# Patient Record
Sex: FEMALE | Race: ASIAN
[De-identification: names, ages, dates, MRNs, and addresses within clinical notes are randomized per-mention and may not be internally consistent; named-entity substitution may affect disease eponyms.]

---

## 2019-11-06 ENCOUNTER — HOSPITAL ENCOUNTER (OUTPATIENT)
Dept: HOSPITAL 75 - PREOP | Age: 84
Discharge: HOME | End: 2019-11-06
Attending: SURGERY
Payer: MEDICARE

## 2019-11-06 DIAGNOSIS — Z01.818: Primary | ICD-10-CM

## 2022-06-02 ENCOUNTER — HOSPITAL ENCOUNTER (EMERGENCY)
Dept: HOSPITAL 75 - ER | Age: 87
Discharge: HOME | End: 2022-06-02
Payer: MEDICARE

## 2022-06-02 VITALS — BODY MASS INDEX: 25.49 KG/M2 | WEIGHT: 129.85 LBS | HEIGHT: 59.84 IN

## 2022-06-02 VITALS — SYSTOLIC BLOOD PRESSURE: 195 MMHG | DIASTOLIC BLOOD PRESSURE: 78 MMHG

## 2022-06-02 DIAGNOSIS — R53.1: ICD-10-CM

## 2022-06-02 DIAGNOSIS — Z79.84: ICD-10-CM

## 2022-06-02 DIAGNOSIS — R03.0: Primary | ICD-10-CM

## 2022-06-02 DIAGNOSIS — E11.9: ICD-10-CM

## 2022-06-02 LAB
ALBUMIN SERPL-MCNC: 3.6 GM/DL (ref 3.2–4.5)
ALP SERPL-CCNC: 47 U/L (ref 40–136)
ALT SERPL-CCNC: 15 U/L (ref 0–55)
APTT PPP: YELLOW S
BACTERIA #/AREA URNS HPF: (no result) /HPF
BASOPHILS # BLD AUTO: 0.1 10^3/UL (ref 0–0.1)
BASOPHILS NFR BLD AUTO: 2 % (ref 0–10)
BILIRUB SERPL-MCNC: 0.4 MG/DL (ref 0.1–1)
BILIRUB UR QL STRIP: NEGATIVE
BUN/CREAT SERPL: 27
CALCIUM SERPL-MCNC: 9.4 MG/DL (ref 8.5–10.1)
CHLORIDE SERPL-SCNC: 105 MMOL/L (ref 98–107)
CO2 SERPL-SCNC: 22 MMOL/L (ref 21–32)
CREAT SERPL-MCNC: 1.09 MG/DL (ref 0.6–1.3)
EOSINOPHIL # BLD AUTO: 0.2 10^3/UL (ref 0–0.3)
EOSINOPHIL NFR BLD AUTO: 3 % (ref 0–10)
FIBRINOGEN PPP-MCNC: CLEAR MG/DL
GFR SERPLBLD BASED ON 1.73 SQ M-ARVRAT: 49 ML/MIN
GLUCOSE SERPL-MCNC: 209 MG/DL (ref 70–105)
GLUCOSE UR STRIP-MCNC: (no result) MG/DL
HCT VFR BLD CALC: 34 % (ref 35–52)
HGB BLD-MCNC: 11.5 G/DL (ref 11.5–16)
HYALINE CASTS #/AREA URNS LPF: (no result) /LPF
KETONES UR QL STRIP: NEGATIVE
LEUKOCYTE ESTERASE UR QL STRIP: NEGATIVE
LYMPHOCYTES # BLD AUTO: 1.7 10^3/UL (ref 1–4)
LYMPHOCYTES NFR BLD AUTO: 33 % (ref 12–44)
MAGNESIUM SERPL-MCNC: 2.3 MG/DL (ref 1.6–2.4)
MANUAL DIFFERENTIAL PERFORMED BLD QL: NO
MCH RBC QN AUTO: 33 PG (ref 25–34)
MCHC RBC AUTO-ENTMCNC: 34 G/DL (ref 32–36)
MCV RBC AUTO: 96 FL (ref 80–99)
MONOCYTES # BLD AUTO: 0.4 10^3/UL (ref 0–1)
MONOCYTES NFR BLD AUTO: 7 % (ref 0–12)
NEUTROPHILS # BLD AUTO: 2.7 10^3/UL (ref 1.8–7.8)
NEUTROPHILS NFR BLD AUTO: 53 % (ref 42–75)
NITRITE UR QL STRIP: NEGATIVE
PH UR STRIP: 6 [PH] (ref 5–9)
PLATELET # BLD: 277 10^3/UL (ref 130–400)
PMV BLD AUTO: 10.2 FL (ref 9–12.2)
POTASSIUM SERPL-SCNC: 4.3 MMOL/L (ref 3.6–5)
PROT SERPL-MCNC: 7.6 GM/DL (ref 6.4–8.2)
PROT UR QL STRIP: (no result)
RBC #/AREA URNS HPF: (no result) /HPF
SODIUM SERPL-SCNC: 138 MMOL/L (ref 135–145)
SP GR UR STRIP: 1.02 (ref 1.02–1.02)
SQUAMOUS #/AREA URNS HPF: (no result) /HPF
WBC # BLD AUTO: 5.1 10^3/UL (ref 4.3–11)
WBC #/AREA URNS HPF: (no result) /HPF

## 2022-06-02 PROCEDURE — 84484 ASSAY OF TROPONIN QUANT: CPT

## 2022-06-02 PROCEDURE — 36415 COLL VENOUS BLD VENIPUNCTURE: CPT

## 2022-06-02 PROCEDURE — 82947 ASSAY GLUCOSE BLOOD QUANT: CPT

## 2022-06-02 PROCEDURE — 86141 C-REACTIVE PROTEIN HS: CPT

## 2022-06-02 PROCEDURE — 81000 URINALYSIS NONAUTO W/SCOPE: CPT

## 2022-06-02 PROCEDURE — 80053 COMPREHEN METABOLIC PANEL: CPT

## 2022-06-02 PROCEDURE — 93005 ELECTROCARDIOGRAM TRACING: CPT

## 2022-06-02 PROCEDURE — 83735 ASSAY OF MAGNESIUM: CPT

## 2022-06-02 PROCEDURE — 85025 COMPLETE CBC W/AUTO DIFF WBC: CPT

## 2022-06-02 NOTE — ED GENERAL
General


Chief Complaint:  General Problems/Pain


Stated Complaint:  WEAKNESS


Source of Information:  Patient, Family


Exam Limitations:  Language Barrier





History of Present Illness


Date Seen by Provider:  Jun 2, 2022


Time Seen by Provider:  17:40


Initial Comments


Patient is a 88-year-old female presents ED family for weakness.  This occurred 

a few hours ago.  She was taking the mail out to the mailbox when she returned 

and felt weak going up the steps and slightly fell down.  She denies hitting her

head, loss of consciousness, headache, dizziness.  She states she felt like both

of her legs were going to give out.  That has improved.  She has no current 

complaints.  According to family when she walks she has slight tremoring.  

Similar episode about a year ago and blood sugar was over 300.  She is a type II

diabetic currently on metformin.  No history of COPD, asthma, coronary artery 

disease.  She was doing fine this morning according to family.  Has been moving 

around the house without assistance.  No issues or concerns with urination, 

cough, vomiting, diarrhea, headache, visual changes, unilateral muscle weakness 

or sensory changes.  Family states she is at her current baseline





Allergies and Home Medications


Allergies


Coded Allergies:  


     Penicillins (Verified  Allergy, Unknown, 11/6/19)





Patient Home Medication List


Home Medication List Reviewed:  Yes


Aspirin (Aspir-Low) 81 Mg Tablet.dr, 25 MG PO DAILY, (Reported)


   Entered as Reported by: MIREYA INFANTE on 11/7/19 1151


Hydrocodone Bit/Acetaminophen (Lortab  5 Mg Tablet) 1 Tab Tab, 1-2 TAB PO Q4-6HR

PRN for PAIN-MODERATE


   Prescribed by: MIREYA INFANTE on 11/7/19 1612


Multivitamin (Multiple Vitamins) 1 Each Tablet, 1 EACH PO DAILY, (Reported)


   Entered as Reported by: MIREYA INFANTE on 11/7/19 1151





Review of Systems


Review of Systems


Constitutional:  No chills, No diaphoresis, No malaise; weakness


EENTM:  No blurred vision, No double vision, No eye pain, No nose pain, No 

throat pain


Respiratory:  No cough, No short of breath


Cardiovascular:  No chest pain, No edema


Gastrointestinal:  No abdominal pain, No diarrhea, No nausea, No vomiting


Genitourinary:  No decreased output, No discharge


Musculoskeletal:  No back pain, No joint pain





All Other Systems Reviewed


Negative Unless Noted:  Yes





Past Medical-Social-Family Hx


Seasonal Allergies


Seasonal Allergies:  No





Past Medical History


Surgeries:  Yes (bilat cataract approx 20 yrs ago)


Respiratory:  No


Cardiac:  No


Neurological:  No


Genitourinary:  No


Gastrointestinal:  No


Musculoskeletal:  No


Endocrine:  No


HEENT:  Yes (bilat cataract surgery approx 20yrs ago)


Cataract


Hearing Impairment:  Denies


Cancer:  No


Psychosocial:  No


Integumentary:  No


Blood Disorders:  No





Physical Exam


Vital Signs





Vital Signs - First Documented








 6/2/22





 17:25


 


Temp 36.6


 


Pulse 82


 


Resp 18


 


B/P (MAP) 178/67 (104)


 


Pulse Ox 96


 


O2 Delivery Room Air





Capillary Refill :


Height, Weight, BMI


Height: '"


Weight: lbs. oz. kg; 22.92 BMI


Method:


General Appearance:  No Apparent Distress, WD/WN


Eyes:  Bilateral Eye Normal Inspection, Bilateral Eye PERRL, Bilateral Eye EOMI


HEENT:  PERRL/EOMI, TMs Normal, Normal ENT Inspection


Neck:  Full Range of Motion, Normal Inspection, Non Tender, Supple


Respiratory:  Chest Non Tender, Lungs Clear, Normal Breath Sounds, No Accessory 

Muscle Use, No Respiratory Distress


Cardiovascular:  Regular Rate, Rhythm, No Edema, No Gallop, No JVD, No Murmur


Gastrointestinal:  Normal Bowel Sounds, No Organomegaly, No Pulsatile Mass, Non 

Tender, Soft


Back:  Normal Inspection


Extremity:  Normal Capillary Refill, Normal Inspection, Normal Range of Motion, 

Non Tender


Neurologic/Psychiatric:  Alert, Oriented x3, No Motor/Sensory Deficits, Normal 

Mood/Affect, CNs II-XII Norm as Tested





Progress/Results/Core Measures


Suspected Sepsis


SIRS


Temperature: 


Pulse:  


Respiratory Rate: 


 


Laboratory Tests


6/2/22 17:38: White Blood Count 5.1


Blood Pressure  / 


Mean: 


 





Laboratory Tests


6/2/22 17:38: 


Creatinine 1.09, Platelet Count 277, Total Bilirubin 0.4








Results/Orders


Lab Results





Laboratory Tests








Test


 6/2/22


17:33 6/2/22


17:38 6/2/22


18:17 Range/Units


 


 


Glucometer 204 H     MG/DL


 


White Blood Count


 


 5.1 


 


 4.3-11.0


10^3/uL


 


Red Blood Count


 


 3.54 L


 


 3.80-5.11


10^6/uL


 


Hemoglobin  11.5   11.5-16.0  g/dL


 


Hematocrit  34 L  35-52  %


 


Mean Corpuscular Volume  96   80-99  fL


 


Mean Corpuscular Hemoglobin  33   25-34  pg


 


Mean Corpuscular Hemoglobin


Concent 


 34 


 


 32-36  g/dL





 


Red Cell Distribution Width  12.7   10.0-14.5  %


 


Platelet Count


 


 277 


 


 130-400


10^3/uL


 


Mean Platelet Volume  10.2   9.0-12.2  fL


 


Immature Granulocyte % (Auto)  1    %


 


Neutrophils (%) (Auto)  53   42-75  %


 


Lymphocytes (%) (Auto)  33   12-44  %


 


Monocytes (%) (Auto)  7   0-12  %


 


Eosinophils (%) (Auto)  3   0-10  %


 


Basophils (%) (Auto)  2   0-10  %


 


Neutrophils # (Auto)


 


 2.7 


 


 1.8-7.8


10^3/uL


 


Lymphocytes # (Auto)


 


 1.7 


 


 1.0-4.0


10^3/uL


 


Monocytes # (Auto)


 


 0.4 


 


 0.0-1.0


10^3/uL


 


Eosinophils # (Auto)


 


 0.2 


 


 0.0-0.3


10^3/uL


 


Basophils # (Auto)


 


 0.1 


 


 0.0-0.1


10^3/uL


 


Immature Granulocyte # (Auto)


 


 0.1 


 


 0.0-0.1


10^3/uL


 


Sodium Level  138   135-145  MMOL/L


 


Potassium Level  4.3   3.6-5.0  MMOL/L


 


Chloride Level  105     MMOL/L


 


Carbon Dioxide Level  22   21-32  MMOL/L


 


Anion Gap  11   5-14  MMOL/L


 


Blood Urea Nitrogen  29 H  7-18  MG/DL


 


Creatinine


 


 1.09 


 


 0.60-1.30


MG/DL


 


Estimat Glomerular Filtration


Rate 


 49 


 


  





 


BUN/Creatinine Ratio  27    


 


Glucose Level  209 H    MG/DL


 


Calcium Level  9.4   8.5-10.1  MG/DL


 


Corrected Calcium  9.7   8.5-10.1  MG/DL


 


Magnesium Level  2.3   1.6-2.4  MG/DL


 


Total Bilirubin  0.4   0.1-1.0  MG/DL


 


Aspartate Amino Transf


(AST/SGOT) 


 20 


 


 5-34  U/L





 


Alanine Aminotransferase


(ALT/SGPT) 


 15 


 


 0-55  U/L





 


Alkaline Phosphatase  47     U/L


 


Troponin I  < 0.028   <0.028  NG/ML


 


C-Reactive Protein High


Sensitivity 


 0.20 


 


 0.00-0.50


MG/DL


 


Total Protein  7.6   6.4-8.2  GM/DL


 


Albumin  3.6   3.2-4.5  GM/DL


 


Urine Color   YELLOW   


 


Urine Clarity   CLEAR   


 


Urine pH   6.0  5-9  


 


Urine Specific Gravity   1.020  1.016-1.022  


 


Urine Protein   2+ H NEGATIVE  


 


Urine Glucose (UA)   TRACE H NEGATIVE  


 


Urine Ketones   NEGATIVE  NEGATIVE  


 


Urine Nitrite   NEGATIVE  NEGATIVE  


 


Urine Bilirubin   NEGATIVE  NEGATIVE  


 


Urine Urobilinogen   0.2  < = 1.0  MG/DL


 


Urine Leukocyte Esterase   NEGATIVE  NEGATIVE  


 


Urine RBC (Auto)   TRACE-I H NEGATIVE  


 


Urine RBC   RARE   /HPF


 


Urine WBC   RARE   /HPF


 


Urine Squamous Epithelial


Cells 


 


 RARE 


  /HPF





 


Urine Crystals   NONE   /LPF


 


Urine Bacteria   TRACE   /HPF


 


Urine Casts   PRESENT   /LPF


 


Urine Hyaline Casts   0-2 H  /LPF


 


Urine Mucus   SMALL H  /LPF


 


Urine Culture Indicated   NO   








My Orders





Orders - JOHANN VILLATORO


Cbc With Automated Diff (6/2/22 17:38)


Comprehensive Metabolic Panel (6/2/22 17:38)


Ua Culture If Indicated (6/2/22 17:38)


Magnesium (6/2/22 17:38)


Ekg Tracing (6/2/22 17:38)


Hs C Reactive Protein (6/2/22 17:38)


Troponin I [place] (6/2/22 18:06)





Vital Signs/I&O











 6/2/22 6/2/22 6/2/22 6/2/22





 17:25 18:25 18:39 18:58


 


Temp 36.6   


 


Pulse 82 78 73 75


 


Resp 18 18 18 18


 


B/P (MAP) 178/67 (104) 205/80 195/78 195/78


 


Pulse Ox 96 95 96 95


 


O2 Delivery Room Air Room Air Room Air Room Air





Capillary Refill :





Point of Care Testing


Finger Stick Blood Glucose:  304


Blood Glucose Action Taken:  RN NOTIFIED





ECG


Comment


Sinus rhythm, possible left atrial enlargement, 76 bpm, QRS duration 86 MS, QTc 

401 MS





Departure


Communication (PCP)


Episode of weakness today while going up the stairs.  Felt like her legs were 

going to give out.  She is currently asymptomatic on arrival.  EKG without 

evidence of ST elevation or depression or arrhythmia.  Normal troponin.  Lab 

work was otherwise unremarkable besides elevated blood sugar 200.  She is type 

II diabetic currently on metformin.  Urinalysis without strong evidence of 

infection.  Patient with a stable gait here.  She has no current complaints.  

According to family similar type episode a year ago with elevated blood sugar.  

Continue monitoring blood sugar.  Continue monitoring patient at home.  Patient 

is at her current normal baseline according to family.  Vital signs stable 

besides elevated blood pressure  Of 178/67.  Did increase slightly to 195/78.  

Family states that she is not currently on blood pressure medication.  Did 

discuss starting a medication versus follow-up with her primary care physician. 

They would rather follow up outpatient with [facility] pcp.  She is currently asympto

matic but did discuss with family complications secondary to sustain 

hypertension.  They acknowledged.  Discuss giving her a dose of iv hydrazine 

here but they want to wait.  She has no headache, dizziness, visual changes, 

bruits.  Outpatient follow-up with PCP in 2 to 3 days for reevaluation. Patient 

kidney function is normal. Recommending follow bp at home.





Impression





   Primary Impression:  


   Weakness


   Additional Impression:  


   Elevated blood pressure reading


Disposition:  01 HOME, SELF-CARE


Condition:  Stable





Departure-Patient Inst.


Decision time for Depature:  18:50


Referrals:  


MEÑO VELASCO (PCP)


Primary Care Physician


Patient Instructions:  JOHANN Jordan ED           Jun 2, 2022 17:42